# Patient Record
Sex: FEMALE | Race: WHITE | URBAN - METROPOLITAN AREA
[De-identification: names, ages, dates, MRNs, and addresses within clinical notes are randomized per-mention and may not be internally consistent; named-entity substitution may affect disease eponyms.]

---

## 2021-08-19 ENCOUNTER — OFFICE VISIT (OUTPATIENT)
Dept: URBAN - METROPOLITAN AREA CLINIC 85 | Facility: CLINIC | Age: 66
End: 2021-08-19
Payer: MEDICARE

## 2021-08-19 DIAGNOSIS — G43.909: ICD-10-CM

## 2021-08-19 DIAGNOSIS — H43.811 VITREOUS DEGENERATION, RIGHT EYE: ICD-10-CM

## 2021-08-19 DIAGNOSIS — H35.3131 NONEXUDATIVE MACULAR DEGENERATION, EARLY DRY STAGE, BILATERAL: Primary | ICD-10-CM

## 2021-08-19 PROCEDURE — 92134 CPTRZ OPH DX IMG PST SGM RTA: CPT | Performed by: OPTOMETRIST

## 2021-08-19 PROCEDURE — 92004 COMPRE OPH EXAM NEW PT 1/>: CPT | Performed by: OPTOMETRIST

## 2021-08-19 ASSESSMENT — VISUAL ACUITY
OS: 20/25
OD: 20/25

## 2021-08-19 ASSESSMENT — KERATOMETRY
OS: 46.75
OD: 46.75

## 2021-08-19 ASSESSMENT — INTRAOCULAR PRESSURE
OS: 16
OD: 16

## 2021-08-19 NOTE — IMPRESSION/PLAN
Impression: Nonexudative macular degeneration, early dry stage, bilateral: H36.8914. Plan: Macular degeneration, dry type. Will continue to monitor vision and the patient has been instructed to call with any vision changes. Discussed AREDS II recommendations and studies showing potential slowing of progression. Consult with FMD to make sure there is no contraindication to AREDS II formulation use from their perspective. Pt. given written list of AREDS II formulation. Discussed home [address] (AG) monitoring QD and demonstrated use in office. Pt. given AG for home monitoring. Pt. instructed to call ASAP if acute VA change or change on AG, as that may indicate conversion to exudative macular degeneration.

## 2021-08-19 NOTE — IMPRESSION/PLAN
Impression: Migraine, not intractable, without status migrainosus: G43.909. Plan: Discussed symptoms consistent with ophthalmic migraine visual aura. Discussed increased risk of cardiovascular events with opthalmic migraine history and importance of follow up as scheduled with  FMD regarding cardiovascular risk factor monitoring. See FMD/ Neurology if headache treatment indicated.

## 2021-08-23 ENCOUNTER — OFFICE VISIT (OUTPATIENT)
Dept: URBAN - METROPOLITAN AREA CLINIC 82 | Facility: CLINIC | Age: 66
End: 2021-08-23
Payer: MEDICARE

## 2021-08-23 PROCEDURE — 92015 DETERMINE REFRACTIVE STATE: CPT | Performed by: OPTOMETRIST

## 2021-08-23 PROCEDURE — 92012 INTRM OPH EXAM EST PATIENT: CPT | Performed by: OPTOMETRIST

## 2021-08-23 ASSESSMENT — INTRAOCULAR PRESSURE
OS: 16
OD: 16

## 2021-08-23 ASSESSMENT — VISUAL ACUITY
OD: 20/50
OS: 20/40

## 2021-08-23 ASSESSMENT — KERATOMETRY
OD: 45.88
OS: 46.13

## 2021-08-23 NOTE — IMPRESSION/PLAN
Impression: Myopia, bilateral: H52.13. Plan: unstable refraction due to contact lens over wear. Re-refract 2 weeks.

## 2021-08-30 ENCOUNTER — OFFICE VISIT (OUTPATIENT)
Dept: URBAN - METROPOLITAN AREA CLINIC 82 | Facility: CLINIC | Age: 66
End: 2021-08-30
Payer: MEDICARE

## 2021-08-30 DIAGNOSIS — H52.13 MYOPIA, BILATERAL: ICD-10-CM

## 2021-08-30 DIAGNOSIS — H18.823 CORNEAL DISORDER DUE TO CONTACT LENS, BILATERAL: ICD-10-CM

## 2021-08-30 DIAGNOSIS — H25.13 AGE-RELATED NUCLEAR CATARACT, BILATERAL: Primary | ICD-10-CM

## 2021-08-30 PROCEDURE — 92015 DETERMINE REFRACTIVE STATE: CPT | Performed by: OPTOMETRIST

## 2021-08-30 PROCEDURE — 99212 OFFICE O/P EST SF 10 MIN: CPT | Performed by: OPTOMETRIST

## 2021-08-30 ASSESSMENT — VISUAL ACUITY
OS: 20/40
OD: 20/40

## 2021-08-30 ASSESSMENT — KERATOMETRY
OD: 46.63
OS: 46.75

## 2021-08-30 ASSESSMENT — INTRAOCULAR PRESSURE
OS: 17
OD: 18

## 2021-08-30 NOTE — IMPRESSION/PLAN
Impression: Age-related nuclear cataract, bilateral: H25.13. Plan: Cataracts account for the patient's complaints. Discussed all risks, benefits, procedures and recovery. Patient understands changing glasses will not improve vision. Patient desires to have surgery, recommend phacoemulsification with intraocular lens. Refer to Dr. Kwan Kumar  for cataract consult  OU.

## 2021-08-30 NOTE — IMPRESSION/PLAN
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 Plan: Will continue to observe.

## 2021-10-21 ENCOUNTER — OFFICE VISIT (OUTPATIENT)
Dept: URBAN - METROPOLITAN AREA CLINIC 85 | Facility: CLINIC | Age: 66
End: 2021-10-21
Payer: MEDICARE

## 2021-10-21 DIAGNOSIS — H25.11 AGE-RELATED NUCLEAR CATARACT, RIGHT EYE: ICD-10-CM

## 2021-10-21 PROCEDURE — 92004 COMPRE OPH EXAM NEW PT 1/>: CPT | Performed by: OPHTHALMOLOGY

## 2021-10-21 PROCEDURE — 92136 OPHTHALMIC BIOMETRY: CPT | Performed by: OPHTHALMOLOGY

## 2021-10-21 RX ORDER — KETOROLAC TROMETHAMINE 5 MG/ML
0.5 % SOLUTION OPHTHALMIC
Qty: 1 | Refills: 1 | Status: INACTIVE
Start: 2021-10-21 | End: 2022-01-05

## 2021-10-21 ASSESSMENT — INTRAOCULAR PRESSURE
OS: 15
OD: 15

## 2021-10-21 ASSESSMENT — VISUAL ACUITY
OS: 20/25
OD: 20/25

## 2021-10-21 NOTE — IMPRESSION/PLAN
Impression: Myopia, bilateral: H52.13. Plan: Discussed unstable refraction due to contact lens over wear. See above plan.

## 2021-10-21 NOTE — IMPRESSION/PLAN
Impression: Age-related nuclear cataract, bilateral: H25.13. Plan:  Discussed cataracts with patient. Discussed treatment options. Surgical treatment is recommended. Surgical risks and benefits discussed. Patient elects surgical treatment. Recommend surgery OU, OS first. ORA, standard lens, Aim OD: plano. Aim OS: plano. Advised need to stay out of contact for a short period of time and will need to repeat measurements.

## 2021-12-27 ENCOUNTER — POST-OPERATIVE VISIT (OUTPATIENT)
Dept: URBAN - METROPOLITAN AREA CLINIC 82 | Facility: CLINIC | Age: 66
End: 2021-12-27
Payer: MEDICARE

## 2021-12-27 ENCOUNTER — SURGERY (OUTPATIENT)
Dept: URBAN - METROPOLITAN AREA SURGERY 55 | Facility: SURGERY | Age: 66
End: 2021-12-27
Payer: MEDICARE

## 2021-12-27 DIAGNOSIS — Z48.810 ENCOUNTER FOR SURGICAL AFTERCARE FOLLOWING SURGERY ON A SENSE ORGAN: Primary | ICD-10-CM

## 2021-12-27 PROCEDURE — 99024 POSTOP FOLLOW-UP VISIT: CPT | Performed by: OPTOMETRIST

## 2021-12-27 PROCEDURE — 66984 XCAPSL CTRC RMVL W/O ECP: CPT | Performed by: OPHTHALMOLOGY

## 2021-12-27 ASSESSMENT — INTRAOCULAR PRESSURE
OS: 9
OS: 9

## 2021-12-27 NOTE — IMPRESSION/PLAN
Impression: S/P Cataract Extraction by phacoemulsification with IOL placement; ORA OS - . Encounter for surgical aftercare following surgery on a sense organ  Z48.810. Plan: Educated pt follow up 1 week for post op visit, RTC sooner if ocular comfort decreases or vision reduces. --Continue Ketorolac 0.5%--Advised patient to use artificial tears for comfort.

## 2022-01-03 ENCOUNTER — POST-OPERATIVE VISIT (OUTPATIENT)
Dept: URBAN - METROPOLITAN AREA CLINIC 82 | Facility: CLINIC | Age: 67
End: 2022-01-03
Payer: MEDICARE

## 2022-01-03 PROCEDURE — 99024 POSTOP FOLLOW-UP VISIT: CPT | Performed by: OPTOMETRIST

## 2022-01-03 PROCEDURE — 92134 CPTRZ OPH DX IMG PST SGM RTA: CPT | Performed by: OPTOMETRIST

## 2022-01-03 PROCEDURE — 92015 DETERMINE REFRACTIVE STATE: CPT | Performed by: OPTOMETRIST

## 2022-01-03 RX ORDER — TIMOLOL MALEATE 5 MG/ML
0.5 % SOLUTION/ DROPS OPHTHALMIC
Qty: 1 | Refills: 0 | Status: INACTIVE
Start: 2022-01-03 | End: 2022-01-17

## 2022-01-03 ASSESSMENT — KERATOMETRY
OS: 46.88
OD: 46.50

## 2022-01-03 ASSESSMENT — INTRAOCULAR PRESSURE
OS: 25
OD: 17
OS: 27

## 2022-01-03 ASSESSMENT — VISUAL ACUITY: OS: 20/20

## 2022-01-03 NOTE — IMPRESSION/PLAN
Impression: S/P Cataract Extraction by phacoemulsification with IOL placement; ORA OS - 7 Days. Encounter for surgical aftercare following surgery on a sense organ  Z48.810. Plan: Educated pt on exam findings. RTC 1 week for post op of 2nd eye, or sooner if changes in vision or ocular comfort. Educated pt begin Timolol for pressure OS (pt to obtain PCP approval first). Begin Timolol 0.5% (after approval from PCP)--Advised patient to use artificial tears for comfort.

## 2022-01-05 ENCOUNTER — SURGERY (OUTPATIENT)
Dept: URBAN - METROPOLITAN AREA SURGERY 55 | Facility: SURGERY | Age: 67
End: 2022-01-05
Payer: MEDICARE

## 2022-01-05 ENCOUNTER — POST-OPERATIVE VISIT (OUTPATIENT)
Dept: URBAN - METROPOLITAN AREA CLINIC 82 | Facility: CLINIC | Age: 67
End: 2022-01-05
Payer: MEDICARE

## 2022-01-05 PROCEDURE — 99024 POSTOP FOLLOW-UP VISIT: CPT | Performed by: OPTOMETRIST

## 2022-01-05 PROCEDURE — 66984 XCAPSL CTRC RMVL W/O ECP: CPT | Performed by: OPHTHALMOLOGY

## 2022-01-05 RX ORDER — KETOROLAC TROMETHAMINE 5 MG/ML
0.5 % SOLUTION OPHTHALMIC
Qty: 5 | Refills: 0 | Status: INACTIVE
Start: 2022-01-05 | End: 2022-02-02

## 2022-01-05 ASSESSMENT — INTRAOCULAR PRESSURE
OS: 22
OD: 5
OS: 22
OD: 5

## 2022-01-05 NOTE — IMPRESSION/PLAN
Impression: S/P Cataract Extraction by phacoemulsification with IOL placement; ORA OD - . Presence of intraocular lens  Z96.1. Excellent post op course   Post operative instructions reviewed - Plan: Follow Post op instructions.  Continue Ketorolac 0.5% as directed-- Discontinue timolol OS

## 2022-01-12 ENCOUNTER — POST-OPERATIVE VISIT (OUTPATIENT)
Dept: URBAN - METROPOLITAN AREA CLINIC 82 | Facility: CLINIC | Age: 67
End: 2022-01-12
Payer: MEDICARE

## 2022-01-12 PROCEDURE — 99024 POSTOP FOLLOW-UP VISIT: CPT | Performed by: OPTOMETRIST

## 2022-01-12 ASSESSMENT — INTRAOCULAR PRESSURE
OS: 17
OD: 15

## 2022-01-12 NOTE — IMPRESSION/PLAN
Impression: S/P Cataract Extraction by phacoemulsification with IOL placement; ORA OD - 7 Days. Presence of intraocular lens  Z96.1. Excellent post op course   Post operative instructions reviewed - Continue GTTS QID OU. Plan: Membrane on OS discussed in detail with patient. --Continue Ketorolac 0.5%--Advised patient to use artificial tears for comfort.

## 2022-02-02 ENCOUNTER — POST-OPERATIVE VISIT (OUTPATIENT)
Dept: URBAN - METROPOLITAN AREA CLINIC 82 | Facility: CLINIC | Age: 67
End: 2022-02-02
Payer: MEDICARE

## 2022-02-02 DIAGNOSIS — H59.033 CYSTOID MACULAR EDEMA FOLLOWING CATARACT SURGERY, BILATERAL: ICD-10-CM

## 2022-02-02 DIAGNOSIS — Z96.1 PRESENCE OF INTRAOCULAR LENS: Primary | ICD-10-CM

## 2022-02-02 PROCEDURE — 99024 POSTOP FOLLOW-UP VISIT: CPT | Performed by: OPTOMETRIST

## 2022-02-02 PROCEDURE — 92134 CPTRZ OPH DX IMG PST SGM RTA: CPT | Performed by: OPTOMETRIST

## 2022-02-02 PROCEDURE — 92015 DETERMINE REFRACTIVE STATE: CPT | Performed by: OPTOMETRIST

## 2022-02-02 RX ORDER — PREDNISOLONE ACETATE 10 MG/ML
1 % SUSPENSION/ DROPS OPHTHALMIC
Qty: 5 | Refills: 0 | Status: ACTIVE
Start: 2022-02-02

## 2022-02-02 RX ORDER — KETOROLAC TROMETHAMINE 5 MG/ML
0.5 % SOLUTION OPHTHALMIC
Qty: 5 | Refills: 0 | Status: ACTIVE
Start: 2022-02-02

## 2022-02-02 ASSESSMENT — INTRAOCULAR PRESSURE
OD: 17
OS: 20

## 2022-02-02 ASSESSMENT — VISUAL ACUITY
OS: 20/40
OD: 20/40

## 2022-02-02 NOTE — IMPRESSION/PLAN
Impression: S/P Cataract Extraction by phacoemulsification with IOL placement; ORA OD - 28 Days. Presence of intraocular lens  Z96.1. Excellent post op course   Post operative instructions reviewed - Plan: Follow Post op instructions. --Continue Ketorolac 0.5% BID OU-- Start Pred Forte BID OU. Pt's daughter/Family